# Patient Record
Sex: FEMALE | Race: WHITE | URBAN - METROPOLITAN AREA
[De-identification: names, ages, dates, MRNs, and addresses within clinical notes are randomized per-mention and may not be internally consistent; named-entity substitution may affect disease eponyms.]

---

## 2022-03-30 ENCOUNTER — OFFICE VISIT (OUTPATIENT)
Dept: URBAN - METROPOLITAN AREA CLINIC 85 | Facility: CLINIC | Age: 72
End: 2022-03-30
Payer: MEDICARE

## 2022-03-30 DIAGNOSIS — H26.493 OTHER SECONDARY CATARACT, BILATERAL: ICD-10-CM

## 2022-03-30 DIAGNOSIS — H16.141 PUNCTATE KERATITIS OF RIGHT EYE: Primary | ICD-10-CM

## 2022-03-30 DIAGNOSIS — H10.9 CONJUNCTIVITIS: ICD-10-CM

## 2022-03-30 PROCEDURE — 92004 COMPRE OPH EXAM NEW PT 1/>: CPT | Performed by: OPHTHALMOLOGY

## 2022-03-30 RX ORDER — OFLOXACIN 3 MG/ML
0.3 % SOLUTION/ DROPS OPHTHALMIC
Qty: 1 | Refills: 0 | Status: ACTIVE
Start: 2022-03-30

## 2022-03-30 ASSESSMENT — INTRAOCULAR PRESSURE
OS: 9
OD: 8

## 2022-03-30 ASSESSMENT — VISUAL ACUITY
OS: 20/20
OD: 20/20

## 2022-03-30 ASSESSMENT — KERATOMETRY
OS: 46.38
OD: 47.50

## 2022-03-30 NOTE — IMPRESSION/PLAN
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 Plan: Discussed  diagnosis in detail. Recommend Systane Complete QID OD (OS OK if desired). Discussed prescription medication options such as Restasis and Kylah Corpus in the future. Also discussed potential of punctal plugs/punctal cautery.

## 2022-03-30 NOTE — IMPRESSION/PLAN
Impression: Conjunctivitis (Bacterial  OD): H10.9. Plan: Discussed findings with patient. Initiate Ocuflox OD QID for 1 week then D/C.